# Patient Record
Sex: MALE | Race: WHITE
[De-identification: names, ages, dates, MRNs, and addresses within clinical notes are randomized per-mention and may not be internally consistent; named-entity substitution may affect disease eponyms.]

---

## 2018-07-25 ENCOUNTER — HOSPITAL ENCOUNTER (EMERGENCY)
Dept: HOSPITAL 25 - UCCORT | Age: 68
Discharge: HOME | End: 2018-07-25
Payer: MEDICARE

## 2018-07-25 VITALS — DIASTOLIC BLOOD PRESSURE: 68 MMHG | SYSTOLIC BLOOD PRESSURE: 138 MMHG

## 2018-07-25 DIAGNOSIS — Z79.82: ICD-10-CM

## 2018-07-25 DIAGNOSIS — R21: Primary | ICD-10-CM

## 2018-07-25 PROCEDURE — 99202 OFFICE O/P NEW SF 15 MIN: CPT

## 2018-07-25 PROCEDURE — G0463 HOSPITAL OUTPT CLINIC VISIT: HCPCS

## 2018-07-25 NOTE — ED
Skin Complaint





- HPI Summary


HPI Summary: 





He had pain in the left lower flank about a week ago. He then noticed a small 

patch of painful rash on the left lower abd. No fever or chills. 





- History of Current Complaint


Chief Complaint: UCSkin


Time Seen by Provider: 07/25/18 12:33


Stated Complaint: SKIN CONCERN


Hx Obtained From: Patient


Onset/Duration: Started Days Ago


Skin Exposure Onset/Duration: Days Ago


Timing: Constant, Lasting Days


Onset Severity: Moderate


Current Severity: Moderate


Pain Intensity: 4


Skin Location: Discrete


Character: Painful


Aggravating Symptom(s): Touch


Alleviating Symptom(s): Nothing


Associated Signs & Symptoms: Rash, Tenderness





- Allergy/Home Medications


Allergies/Adverse Reactions: 


 Allergies











Allergy/AdvReac Type Severity Reaction Status Date / Time


 


No Known Allergies Allergy   Verified 07/25/18 12:39











Home Medications: 


 Home Medications





Aspirin [Aspir-Low] 81 mg PO DAILY 07/25/18 [History Confirmed 07/25/18]


Ibuprofen 400 mg PO Q8H 07/25/18 [History Confirmed 07/25/18]


Simvastatin 20 mg PO DAILY 07/25/18 [History Confirmed 07/25/18]











PMH/Surg Hx/FS Hx/Imm Hx


Previously Healthy: No





- Surgical History


Surgery Procedure, Year, and Place: BI-FEM BYPASS


Infectious Disease History: No


Infectious Disease History: 


   Denies: Traveled Outside the [place] in Last 30 Days





- Family History


Known Family History: Positive: Other - NO related skin conditions inthe 

family. 





- Social History


Alcohol Use: None


Substance Use Type: Reports: None


Smoking Status (MU): Never Smoked Tobacco





Review of Systems


Positive: Rash


All Other Systems Reviewed And Are Negative: Yes





Physical Exam


Triage Information Reviewed: Yes


Vital Signs On Initial Exam: 


 Initial Vitals











Temp Pulse Resp BP Pulse Ox


 


 99.2 F   102   17   138/68   98 


 


 07/25/18 12:35  07/25/18 12:35  07/25/18 12:35  07/25/18 12:35  07/25/18 12:35











Vital Signs Reviewed: Yes


Appearance: Positive: Well-Appearing, No Pain Distress, Well-Nourished


Skin: Positive: Skin Color Reflects Adequate Perfusion, Dry, Tender, Soft, 

Other - Two small patches of macular papular rash on the left lower abd and 

left lower flank. No induration or abcess..  Negative: Pale, Weeping Skin/

Lesions, Burrow Tracts, Erythema @, Cold Injury, Lymphangitis


Head/Face: Positive: Normal Head/Face Inspection


Eyes: Positive: Normal, EOMI, ROCHELLE


ENT: Positive: Normal ENT inspection


Neck: Positive: Supple, Nontender, No Lymphadenopathy


Respiratory/Lung Sounds: Positive: Clear to Auscultation, Breath Sounds 

Present.  Negative: Decreased Breath Sounds


Cardiovascular: Positive: RRR.  Negative: Murmur, Rub, Tachycardia, Leg Edema 

Left, Leg Edema Right


Abdomen Description: Negative: Distended


Musculoskeletal: Positive: Normal, Strength/ROM Intact


Neurological: Positive: Normal, Sensory/Motor Intact, Alert, Oriented to Person 

Place, Time, CN Intact II-III


Psychiatric: Positive: Normal, Affect/Mood Appropriate.  Negative: Anxious





Diagnostics





- Vital Signs


 Vital Signs











  Temp Pulse Resp BP Pulse Ox


 


 07/25/18 12:35  99.2 F  102  17  138/68  98














- Laboratory


Lab Statement: Any lab studies that have been ordered have been reviewed, and 

results considered in the medical decision making process.





Discharge





- Discharge Plan


Condition: Good


Disposition: HOME


Prescriptions: 


Acyclovir* [Zovirax 400 MG TAB*] 400 mg PO 5ID #35 tab


predniSONE [Prednisone 20 MG TAB] 40 mg PO DAILY #10 tablet


Patient Education Materials:  Shingles (ED)


Referrals: 


Reddy Urena MD [Primary Care Provider] - 





- Billing Disposition and Condition


Condition: GOOD


Disposition: Home